# Patient Record
Sex: MALE | Race: WHITE | URBAN - METROPOLITAN AREA
[De-identification: names, ages, dates, MRNs, and addresses within clinical notes are randomized per-mention and may not be internally consistent; named-entity substitution may affect disease eponyms.]

---

## 2018-02-26 ENCOUNTER — OFFICE VISIT (OUTPATIENT)
Dept: FAMILY MEDICINE | Facility: CLINIC | Age: 28
End: 2018-02-26

## 2018-02-26 VITALS
HEART RATE: 84 BPM | OXYGEN SATURATION: 98 % | WEIGHT: 217.4 LBS | DIASTOLIC BLOOD PRESSURE: 71 MMHG | TEMPERATURE: 98.6 F | BODY MASS INDEX: 31.12 KG/M2 | HEIGHT: 70 IN | SYSTOLIC BLOOD PRESSURE: 131 MMHG

## 2018-02-26 DIAGNOSIS — J20.9 ACUTE BRONCHITIS, UNSPECIFIED ORGANISM: Primary | ICD-10-CM

## 2018-02-26 PROCEDURE — 99203 OFFICE O/P NEW LOW 30 MIN: CPT | Performed by: PHYSICIAN ASSISTANT

## 2018-02-26 RX ORDER — ALBUTEROL SULFATE 90 UG/1
1-2 AEROSOL, METERED RESPIRATORY (INHALATION) EVERY 6 HOURS
Qty: 18 G | Refills: 1 | Status: SHIPPED | OUTPATIENT
Start: 2018-02-26

## 2018-02-26 RX ORDER — LORATADINE 10 MG/1
10 TABLET ORAL DAILY
Qty: 90 TABLET | Refills: 1 | Status: SHIPPED | OUTPATIENT
Start: 2018-02-26

## 2018-02-26 RX ORDER — DEXTROMETHORPHAN POLISTIREX 30 MG/5ML
60 SUSPENSION ORAL 2 TIMES DAILY
Qty: 148 ML | Refills: 0 | Status: SHIPPED | OUTPATIENT
Start: 2018-02-26

## 2018-02-26 RX ORDER — PREDNISONE 20 MG/1
20 TABLET ORAL DAILY
Qty: 5 TABLET | Refills: 0 | Status: SHIPPED | OUTPATIENT
Start: 2018-02-26

## 2018-02-26 NOTE — NURSING NOTE
"Chief Complaint   Patient presents with     Cough       Initial /71  Pulse 84  Temp 98.6  F (37  C) (Tympanic)  Ht 5' 10\" (1.778 m)  Wt 217 lb 6.4 oz (98.6 kg)  SpO2 98%  BMI 31.19 kg/m2 Estimated body mass index is 31.19 kg/(m^2) as calculated from the following:    Height as of this encounter: 5' 10\" (1.778 m).    Weight as of this encounter: 217 lb 6.4 oz (98.6 kg).  Medication Reconciliation: complete     Francie Ybarra MA      "

## 2018-02-26 NOTE — PROGRESS NOTES
SUBJECTIVE:   Je Dobson is a 27 year old male who presents to clinic today for the following health issues:    (new to the clinic, moved from [facility])    Acute Illness   Acute illness concerns: Dry Cough   Onset:  4 days    Fever: no    Chills/Sweats: no    Headache (location?): no    Sinus Pressure:no    Conjunctivitis:  no    Ear Pain: no    Rhinorrhea: no    Congestion: no    Sore Throat: no     Cough: YES-non-productive    Wheeze: no     Decreased Appetite: no    Nausea: no    Vomiting: no    Diarrhea:  no    Dysuria/Freq.: no    Fatigue/Achiness: no    Sick/Strep Exposure: no     Therapies Tried and outcome: none     Has seen a pulmonary specialist in the past.  Has h/o asthma (worse with heat and humidity). Ran out of claritin and albuterol inhaler.  Cough keeping him up at night.  No wheezing.  No h/o severe asthma flares.      He does get bronchitis flares typically this time of the year and is prescribed the following for those:   Albuterol inhaler  Loratadine  Pred 20 mg daily   Phenergan cough med          Problem list and histories reviewed & adjusted, as indicated.  Additional history: as documented    There is no problem list on file for this patient.    History reviewed. No pertinent surgical history.    Social History   Substance Use Topics     Smoking status: Former Smoker     Smokeless tobacco: Never Used     Alcohol use Yes      Comment: socially     History reviewed. No pertinent family history.      Current Outpatient Prescriptions   Medication Sig Dispense Refill     albuterol (PROAIR HFA/PROVENTIL HFA/VENTOLIN HFA) 108 (90 BASE) MCG/ACT Inhaler Inhale 1-2 puffs into the lungs every 6 hours 18 g 1     loratadine (CLARITIN) 10 MG tablet Take 1 tablet (10 mg) by mouth daily 90 tablet 1     predniSONE (DELTASONE) 20 MG tablet Take 1 tablet (20 mg) by mouth daily 5 tablet 0     dextromethorphan (DELSYM) 30 MG/5ML liquid Take 10 mLs (60 mg) by mouth 2 times daily 148 mL 0     BP Readings  "from Last 3 Encounters:   02/26/18 131/71    Wt Readings from Last 3 Encounters:   02/26/18 217 lb 6.4 oz (98.6 kg)                    Reviewed and updated as needed this visit by clinical staff       Reviewed and updated as needed this visit by Provider         ROS:  Constitutional, HEENT, cardiovascular, pulmonary, gi and gu systems are negative, except as otherwise noted.    OBJECTIVE:     /71  Pulse 84  Temp 98.6  F (37  C) (Tympanic)  Ht 5' 10\" (1.778 m)  Wt 217 lb 6.4 oz (98.6 kg)  SpO2 98%  BMI 31.19 kg/m2  Body mass index is 31.19 kg/(m^2).  GENERAL: healthy, alert and no distress  EYES: Eyes grossly normal to inspection, PERRL and conjunctivae and sclerae normal  HENT: ear canals and TM's normal, nose and mouth without ulcers or lesions  NECK: no adenopathy, no asymmetry, masses, or scars and thyroid normal to palpation  RESP: lungs clear to auscultation - no rales, rhonchi or wheezes  CV: regular rate and rhythm, normal S1 S2, no S3 or S4, no murmur, click or rub, no peripheral edema and peripheral pulses strong  ABDOMEN: soft, nontender, no hepatosplenomegaly, no masses and bowel sounds normal  MS: no gross musculoskeletal defects noted, no edema    Diagnostic Test Results:  none     ASSESSMENT/PLAN:         1. Acute bronchitis, unspecified organism  BRONCHITIS    * Antibiotics indicated, see orders.  * I discussed the pathophysiology of bronchitis.  I reviewed the risks and benefits of various over the counter and prescription medications.  Additionally, we reviewed the infectious nature of this condition and techniques to minimize transmission and future infections.    Patient advised to follow up if symptoms worsen or fail to improve as anticipated.     - albuterol (PROAIR HFA/PROVENTIL HFA/VENTOLIN HFA) 108 (90 BASE) MCG/ACT Inhaler; Inhale 1-2 puffs into the lungs every 6 hours  Dispense: 18 g; Refill: 1  - loratadine (CLARITIN) 10 MG tablet; Take 1 tablet (10 mg) by mouth daily  " Dispense: 90 tablet; Refill: 1  - predniSONE (DELTASONE) 20 MG tablet; Take 1 tablet (20 mg) by mouth daily  Dispense: 5 tablet; Refill: 0  - dextromethorphan (DELSYM) 30 MG/5ML liquid; Take 10 mLs (60 mg) by mouth 2 times daily  Dispense: 148 mL; Refill: 0    FUTURE APPOINTMENTS:       - Follow-up visit if symptoms worsen or fail to improve as anticipated.     Virginia Nieves PA-C  [facility]

## 2018-02-26 NOTE — MR AVS SNAPSHOT
"              After Visit Summary   2018    Je Dobson    MRN: [id number]           Patient Information     Date Of Birth          1990        Visit Information        Provider Department      2018 11:40 AM Virginia Nieves PA-C [facility]        Today's Diagnoses     Acute bronchitis, unspecified organism    -  1       Follow-ups after your visit        Who to contact     Normal or non-critical lab and imaging results will be communicated to you by Polyvorehart, letter or phone within 4 business days after the clinic has received the results. If you do not hear from us within 7 days, please contact the clinic through MyChart or phone. If you have a critical or abnormal lab result, we will notify you by phone as soon as possible.  Submit refill requests through [facility] or call your pharmacy and they will forward the refill request to us. Please allow 3 business days for your refill to be completed.          If you need to speak with a  for additional information , please call: [phone]           Additional Information About Your Visit        [facility] Information     [facility] lets you send messages to your doctor, view your test results, renew your prescriptions, schedule appointments and more. To sign up, go to [email or web address] . Click on \"Log in\" on the left side of the screen, which will take you to the Welcome page. Then click on \"Sign up Now\" on the right side of the page.     You will be asked to enter the access code listed below, as well as some personal information. Please follow the directions to create your username and password.     Your access code is: [email or web address]  Expires: 2018 12:20 PM     Your access code will  in 90 days. If you need help or a new code, please call your [place] clinic or [phone].        Care EveryWhere ID     This is your Care EveryWhere ID. This could be used by other organizations to access your " "[place] medical records  [id number]        Your Vitals Were     Pulse Temperature Height Pulse Oximetry BMI (Body Mass Index)       84 98.6  F (37  C) (Tympanic) 5' 10\" (1.778 m) 98% 31.19 kg/m2        Blood Pressure from Last 3 Encounters:   02/26/18 131/71    Weight from Last 3 Encounters:   02/26/18 217 lb 6.4 oz (98.6 kg)              Today, you had the following     No orders found for display         Today's Medication Changes          These changes are accurate as of 2/26/18 12:20 PM.  If you have any questions, ask your nurse or doctor.               Start taking these medicines.        Dose/Directions    albuterol 108 (90 BASE) MCG/ACT Inhaler   Commonly known as:  PROAIR HFA/PROVENTIL HFA/VENTOLIN HFA   Used for:  Acute bronchitis, unspecified organism   Started by:  Virginia Nieves PA-C        Dose:  1-2 puff   Inhale 1-2 puffs into the lungs every 6 hours   Quantity:  18 g   Refills:  1       dextromethorphan 30 MG/5ML liquid   Commonly known as:  DELSYM   Used for:  Acute bronchitis, unspecified organism   Started by:  Virginia Nieves PA-C        Dose:  60 mg   Take 10 mLs (60 mg) by mouth 2 times daily   Quantity:  148 mL   Refills:  0       loratadine 10 MG tablet   Commonly known as:  CLARITIN   Used for:  Acute bronchitis, unspecified organism   Started by:  Virginia Nieves PA-C        Dose:  10 mg   Take 1 tablet (10 mg) by mouth daily   Quantity:  90 tablet   Refills:  1       predniSONE 20 MG tablet   Commonly known as:  DELTASONE   Used for:  Acute bronchitis, unspecified organism   Started by:  Virginia Nieves PA-C        Dose:  20 mg   Take 1 tablet (20 mg) by mouth daily   Quantity:  5 tablet   Refills:  0            Where to get your medicines      [id number] [place] - [address] AT [facility] & [facility]   [place] [address] [place] [address]     Phone:  [phone]     albuterol 108 (90 BASE) " MCG/ACT Inhaler    dextromethorphan 30 MG/5ML liquid    loratadine 10 MG tablet    predniSONE 20 MG tablet                Primary Care Provider Fax #    Physician No Ref-Primary [phone]       No address on file        Equal Access to Services     DAMI MÉNDEZ : Vinnie ahn jt [address], waaxda luqadaha, qaybta kaalmada ademarilyn, celsa mitchellwallace huber. So [facility] [phone].    ATENCIÓN: Si habla español, tiene a guerra disposición servicios gratuitos de asistencia lingüística. Llame al [phone].    We comply with applicable federal civil rights laws and [place] laws. We do not discriminate on the basis of race, color, national origin, age, disability, sex, sexual orientation, or gender identity.            Thank you!     Thank you for choosing [facility]  for your care. Our goal is always to provide you with excellent care. Hearing back from our patients is one way we can continue to improve our services. Please take a few minutes to complete the written survey that you may receive in the mail after your visit with us. Thank you!             Your Updated Medication List - Protect others around you: Learn how to safely use, store and throw away your medicines at www.disposemymeds.org.          This list is accurate as of 2/26/18 12:20 PM.  Always use your most recent med list.                   Brand Name Dispense Instructions for use Diagnosis    albuterol 108 (90 BASE) MCG/ACT Inhaler    PROAIR HFA/PROVENTIL HFA/VENTOLIN HFA    18 g    Inhale 1-2 puffs into the lungs every 6 hours    Acute bronchitis, unspecified organism       dextromethorphan 30 MG/5ML liquid    DELSYM    148 mL    Take 10 mLs (60 mg) by mouth 2 times daily    Acute bronchitis, unspecified organism       loratadine 10 MG tablet    CLARITIN    90 tablet    Take 1 tablet (10 mg) by mouth daily    Acute bronchitis, unspecified organism       predniSONE 20 MG tablet    DELTASONE    5 tablet    Take 1  tablet (20 mg) by mouth daily    Acute bronchitis, unspecified organism